# Patient Record
Sex: FEMALE | URBAN - METROPOLITAN AREA
[De-identification: names, ages, dates, MRNs, and addresses within clinical notes are randomized per-mention and may not be internally consistent; named-entity substitution may affect disease eponyms.]

---

## 2024-08-16 ENCOUNTER — OFFICE VISIT (OUTPATIENT)
Dept: ENDOCRINOLOGY | Facility: CLINIC | Age: 49
End: 2024-08-16
Payer: COMMERCIAL

## 2024-08-16 VITALS
SYSTOLIC BLOOD PRESSURE: 138 MMHG | TEMPERATURE: 97.2 F | HEIGHT: 66 IN | WEIGHT: 187 LBS | HEART RATE: 68 BPM | DIASTOLIC BLOOD PRESSURE: 92 MMHG | OXYGEN SATURATION: 98 % | BODY MASS INDEX: 30.05 KG/M2

## 2024-08-16 DIAGNOSIS — E04.1 THYROID NODULE: ICD-10-CM

## 2024-08-16 DIAGNOSIS — E05.90 HYPERTHYROIDISM: Primary | ICD-10-CM

## 2024-08-16 DIAGNOSIS — E78.2 MIXED HYPERLIPIDEMIA: ICD-10-CM

## 2024-08-16 DIAGNOSIS — E55.9 VITAMIN D DEFICIENCY: ICD-10-CM

## 2024-08-16 PROCEDURE — 99244 OFF/OP CNSLTJ NEW/EST MOD 40: CPT | Performed by: INTERNAL MEDICINE

## 2024-08-16 RX ORDER — EPINEPHRINE 0.15 MG/.3ML
0.15 INJECTION INTRAMUSCULAR ONCE
COMMUNITY

## 2024-08-16 NOTE — ASSESSMENT & PLAN NOTE
She is asymptomatic. TSH was undetectable in 2023 and low normal TSH this year.    Today we discussed all aspects of hypothyroidism including normal thyroid physiology, pathophysiology, review of data, treatment options, dose titration and follow up needs.    We agreed to check a complete thyroid profile based on which further intervention may be planned.    Follow up as needed.

## 2024-08-16 NOTE — PROGRESS NOTES
"    New Consult Note      CC:  Thyroid dysfunction    History of Present Illness:   49 yr female with HLD, obesity BMI 30, subclinical hyperthyroidism and concern for a thyroid nodule on exam.      Physical Exam:  Body mass index is 30.18 kg/m².  /92 (BP Location: Left arm, Patient Position: Sitting, Cuff Size: Standard)   Pulse 68   Temp (!) 97.2 °F (36.2 °C) (Tympanic)   Ht 5' 6\" (1.676 m)   Wt 84.8 kg (187 lb)   SpO2 98%   BMI 30.18 kg/m²    Vitals:    08/16/24 1016   Weight: 84.8 kg (187 lb)        Physical Exam  Constitutional:       General: She is not in acute distress.     Appearance: She is well-developed.   HENT:      Head: Normocephalic and atraumatic.      Nose: Nose normal.   Eyes:      Conjunctiva/sclera: Conjunctivae normal.   Pulmonary:      Effort: Pulmonary effort is normal.   Abdominal:      General: There is no distension.   Musculoskeletal:      Cervical back: Normal range of motion and neck supple.   Skin:     Findings: No rash.      Comments: No icterus   Neurological:      Mental Status: She is alert and oriented to person, place, and time.         Labs:   No results found for: \"HGBA1C\"    No results found for: \"XMV1ESLRJRBG\", \"TSH\", \"N1FDAQO\", \"D5VGUJO\", \"THYROIDAB\"    No results found for: \"CREATININE\", \"BUN\", \"NA\", \"K\", \"CL\", \"CO2\"  No results found for: \"EGFR\"    No results found for: \"ALT\", \"AST\", \"GGT\", \"ALKPHOS\", \"BILITOT\"    No results found for: \"CHOLESTEROL\"  No results found for: \"HDL\"  No results found for: \"TRIG\"  No results found for: \"NONHDLC\"      Assessment/Plan:    1. Hyperthyroidism  Assessment & Plan:  She is asymptomatic. TSH was undetectable in 2023 and low normal TSH this year.    Today we discussed all aspects of hypothyroidism including normal thyroid physiology, pathophysiology, review of data, treatment options, dose titration and follow up needs.    We agreed to check a complete thyroid profile based on which further intervention may be " planned.    Follow up as needed.    Orders:  -     T4, free; Future  -     TSH, 3rd generation; Future  -     Thyroid Antibodies Panel; Future  -     Thyroid stimulating immunoglobulin; Future  2. Thyroid nodule  Assessment & Plan:  Asymmetry on exam today  Orders:  -     US thyroid; Future; Expected date: 08/16/2024  3. Mixed hyperlipidemia  Assessment & Plan:  She has elevated TC, LDL and non HDL - advised diet and lifestyle changes. May be familial.  4. Vitamin D deficiency  Assessment & Plan:  On replacement.        I have spent a total time of 40 minutes on 08/16/24 in caring for this patient including greater than 50% of this time was spent in counseling/coordination of care as listed above.       Discussed with the patient and all questioned fully answered. She will contact me with concerns.    Fabrice Watts